# Patient Record
Sex: MALE | Race: WHITE | NOT HISPANIC OR LATINO | Employment: STUDENT | URBAN - METROPOLITAN AREA
[De-identification: names, ages, dates, MRNs, and addresses within clinical notes are randomized per-mention and may not be internally consistent; named-entity substitution may affect disease eponyms.]

---
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
## 2022-09-20 ENCOUNTER — PRE-ADMISSION TESTING (OUTPATIENT)
Dept: PREADMISSION TESTING | Facility: HOSPITAL | Age: 22
End: 2022-09-20

## 2022-09-20 ENCOUNTER — HOSPITAL ENCOUNTER (OUTPATIENT)
Dept: GENERAL RADIOLOGY | Facility: HOSPITAL | Age: 22
Discharge: HOME | End: 2022-09-20

## 2022-09-20 VITALS
WEIGHT: 206 LBS | RESPIRATION RATE: 16 BRPM | SYSTOLIC BLOOD PRESSURE: 140 MMHG | HEIGHT: 75 IN | BODY MASS INDEX: 25.61 KG/M2 | OXYGEN SATURATION: 99 % | TEMPERATURE: 97.8 F | HEART RATE: 66 BPM | DIASTOLIC BLOOD PRESSURE: 78 MMHG

## 2022-09-20 LAB
ALBUMIN SERPL-MCNC: 4.7 G/DL (ref 3.5–5.2)
ALBUMIN/GLOB SERPL: 2 G/DL
ALP SERPL-CCNC: 70 U/L (ref 39–117)
ALT SERPL W P-5'-P-CCNC: 21 U/L (ref 1–41)
ANION GAP SERPL CALCULATED.3IONS-SCNC: 10.6 MMOL/L (ref 5–15)
AST SERPL-CCNC: 24 U/L (ref 1–40)
BASOPHILS # BLD AUTO: 0.02 10*3/MM3 (ref 0–0.2)
BASOPHILS NFR BLD AUTO: 0.4 % (ref 0–1.5)
BILIRUB SERPL-MCNC: 0.4 MG/DL (ref 0–1.2)
BILIRUB UR QL STRIP: NEGATIVE
BUN SERPL-MCNC: 18 MG/DL (ref 6–20)
BUN/CREAT SERPL: 16.2 (ref 7–25)
CALCIUM SPEC-SCNC: 9.7 MG/DL (ref 8.6–10.5)
CHLORIDE SERPL-SCNC: 100 MMOL/L (ref 98–107)
CLARITY UR: CLEAR
CO2 SERPL-SCNC: 27.4 MMOL/L (ref 22–29)
COLOR UR: YELLOW
CREAT SERPL-MCNC: 1.11 MG/DL (ref 0.76–1.27)
DEPRECATED RDW RBC AUTO: 42.2 FL (ref 37–54)
EGFRCR SERPLBLD CKD-EPI 2021: 96.3 ML/MIN/1.73
EOSINOPHIL # BLD AUTO: 0.12 10*3/MM3 (ref 0–0.4)
EOSINOPHIL NFR BLD AUTO: 2.5 % (ref 0.3–6.2)
ERYTHROCYTE [DISTWIDTH] IN BLOOD BY AUTOMATED COUNT: 13.1 % (ref 12.3–15.4)
GLOBULIN UR ELPH-MCNC: 2.4 GM/DL
GLUCOSE SERPL-MCNC: 86 MG/DL (ref 65–99)
GLUCOSE UR STRIP-MCNC: NEGATIVE MG/DL
HCT VFR BLD AUTO: 47.8 % (ref 37.5–51)
HGB BLD-MCNC: 16.1 G/DL (ref 13–17.7)
HGB UR QL STRIP.AUTO: NEGATIVE
IMM GRANULOCYTES # BLD AUTO: 0.02 10*3/MM3 (ref 0–0.05)
IMM GRANULOCYTES NFR BLD AUTO: 0.4 % (ref 0–0.5)
KETONES UR QL STRIP: NEGATIVE
LEUKOCYTE ESTERASE UR QL STRIP.AUTO: NEGATIVE
LYMPHOCYTES # BLD AUTO: 1.76 10*3/MM3 (ref 0.7–3.1)
LYMPHOCYTES NFR BLD AUTO: 36.5 % (ref 19.6–45.3)
MCH RBC QN AUTO: 30 PG (ref 26.6–33)
MCHC RBC AUTO-ENTMCNC: 33.7 G/DL (ref 31.5–35.7)
MCV RBC AUTO: 89.2 FL (ref 79–97)
MONOCYTES # BLD AUTO: 0.29 10*3/MM3 (ref 0.1–0.9)
MONOCYTES NFR BLD AUTO: 6 % (ref 5–12)
NEUTROPHILS NFR BLD AUTO: 2.61 10*3/MM3 (ref 1.7–7)
NEUTROPHILS NFR BLD AUTO: 54.2 % (ref 42.7–76)
NITRITE UR QL STRIP: NEGATIVE
NRBC BLD AUTO-RTO: 0 /100 WBC (ref 0–0.2)
PH UR STRIP.AUTO: 6.5 [PH] (ref 5–8)
PLATELET # BLD AUTO: 164 10*3/MM3 (ref 140–450)
PMV BLD AUTO: 10.2 FL (ref 6–12)
POTASSIUM SERPL-SCNC: 4.4 MMOL/L (ref 3.5–5.2)
PROT SERPL-MCNC: 7.1 G/DL (ref 6–8.5)
PROT UR QL STRIP: NEGATIVE
QT INTERVAL: 392 MS
RBC # BLD AUTO: 5.36 10*6/MM3 (ref 4.14–5.8)
SODIUM SERPL-SCNC: 138 MMOL/L (ref 136–145)
SP GR UR STRIP: 1.01 (ref 1–1.03)
UROBILINOGEN UR QL STRIP: NORMAL
WBC NRBC COR # BLD: 4.82 10*3/MM3 (ref 3.4–10.8)

## 2022-09-20 PROCEDURE — 80053 COMPREHEN METABOLIC PANEL: CPT

## 2022-09-20 PROCEDURE — 93010 ELECTROCARDIOGRAM REPORT: CPT | Performed by: INTERNAL MEDICINE

## 2022-09-20 PROCEDURE — 36415 COLL VENOUS BLD VENIPUNCTURE: CPT

## 2022-09-20 PROCEDURE — 93005 ELECTROCARDIOGRAM TRACING: CPT

## 2022-09-20 PROCEDURE — 71046 X-RAY EXAM CHEST 2 VIEWS: CPT

## 2022-09-20 PROCEDURE — 81003 URINALYSIS AUTO W/O SCOPE: CPT

## 2022-09-20 PROCEDURE — 85025 COMPLETE CBC W/AUTO DIFF WBC: CPT

## 2022-09-20 NOTE — DISCHARGE INSTRUCTIONS
Take the following medications the morning of surgery: none    Arrival time: Hospital will call      General Instructions:  Do not eat solid food after midnight the night before surgery.  You may drink clear liquids day of surgery but must stop at least one hour before your hospital arrival time.  It is beneficial for you to have a clear drink that contains carbohydrates the day of surgery.  We suggest a 12 to 20 ounce bottle of Gatorade or Powerade for non-diabetic patients or a 12 to 20 ounce bottle of G2 or Powerade Zero for diabetic patients. (Pediatric patients, are not advised to drink a 12 to 20 ounce carbohydrate drink)    Clear liquids are liquids you can see through.  Nothing red in color.     Plain water                               Sports drinks  Sodas                                   Gelatin (Jell-O)  Fruit juices without pulp such as white grape juice and apple juice  Popsicles that contain no fruit or yogurt  Tea or coffee (no cream or milk added)  Gatorade / Powerade  G2 / Powerade Zero    Patients who avoid smoking, chewing tobacco and alcohol for 4 weeks prior to surgery have a reduced risk of post-operative complications.  Quit smoking as many days before surgery as you can.  Do not smoke, use chewing tobacco or drink alcohol the day of surgery.   Bring any papers given to you in the doctor’s office.  Wear clean comfortable clothes.  Do not wear contact lenses, false eyelashes or make-up.  Bring a case for your glasses.   Remove all piercings.  Leave jewelry and any other valuables at home.  Hair extensions with metal clips must be removed prior to surgery.  The Pre-Admission Testing nurse will instruct you to bring medications if unable to obtain an accurate list in Pre-Admission Testing.      Preventing a Surgical Site Infection:  For 2 to 3 days before surgery, avoid shaving with a razor because the razor can irritate skin and make it easier to develop an infection.    Any areas of open skin  can increase the risk of a post-operative wound infection by allowing bacteria to enter and travel throughout the body.  Notify your surgeon if you have any skin wounds / rashes even if it is not near the expected surgical site.  The area will need assessed to determine if surgery should be delayed until it is healed.  The night prior to surgery shower using a fresh bar of anti-bacterial soap (such as Dial) and clean washcloth.  Sleep in a clean bed with clean clothing.  Do not allow pets to sleep with you.  Shower on the morning of surgery using a fresh bar of anti-bacterial soap (such as Dial) and clean washcloth.  Dry with a clean towel and dress in clean clothing.  Ask your surgeon if you will be receiving antibiotics prior to surgery.  Make sure you, your family, and all healthcare providers clean their hands with soap and water or an alcohol based hand  before caring for you or your wound.    Day of surgery:  Your arrival time is approximately two hours before your scheduled surgery time.  Upon arrival, a Pre-op nurse and Anesthesiologist will review your health history, obtain vital signs, and answer questions you may have.  The only belongings needed at this time will be a list of your home medications and if applicable your C-PAP/BI-PAP machine.  A Pre-op nurse will start an IV and you may receive medication in preparation for surgery, including something to help you relax.     Please be aware that surgery does come with discomfort.  We want to make every effort to control your discomfort so please discuss any uncontrolled symptoms with your nurse.   Your doctor will most likely have prescribed pain medications.      If you are going home after surgery you will receive individualized written care instructions before being discharged.  A responsible adult must drive you to and from the hospital on the day of your surgery and stay with you for 24 hours.  Discharge prescriptions can be filled by the  hospital pharmacy during regular pharmacy hours.  If you are having surgery late in the day/evening your prescription may be e-prescribed to your pharmacy.  Please verify your pharmacy hours or chose a 24 hour pharmacy to avoid not having access to your prescription because your pharmacy has closed for the day.    If you are staying overnight following surgery, you will be transported to your hospital room following the recovery period.  [facility] has all private rooms.    If you have any questions please call Pre-Admission Testing at [phone].  Deductibles and co-payments are collected on the day of service. Please be prepared to pay the required co-pay, deductible or deposit on the day of service as defined by your plan.    Call your surgeon immediately if you experience any of the following symptoms:  Sore Throat  Shortness of Breath or difficulty breathing  Cough  Chills  Body soreness or muscle pain  Headache  Fever  New loss of taste or smell  Do not arrive for your surgery ill.  Your procedure will need to be rescheduled to another time.  You will need to call your physician before the day of surgery to avoid any unnecessary exposure to hospital staff as well as other patients.

## 2022-09-27 ENCOUNTER — ANESTHESIA (OUTPATIENT)
Dept: PERIOP | Facility: HOSPITAL | Age: 22
End: 2022-09-27

## 2022-09-27 ENCOUNTER — ANESTHESIA EVENT (OUTPATIENT)
Dept: PERIOP | Facility: HOSPITAL | Age: 22
End: 2022-09-27

## 2022-09-27 ENCOUNTER — HOSPITAL ENCOUNTER (OUTPATIENT)
Facility: HOSPITAL | Age: 22
Setting detail: HOSPITAL OUTPATIENT SURGERY
Discharge: HOME | End: 2022-09-27
Attending: ORTHOPAEDIC SURGERY | Admitting: ORTHOPAEDIC SURGERY

## 2022-09-27 VITALS
RESPIRATION RATE: 16 BRPM | HEART RATE: 65 BPM | DIASTOLIC BLOOD PRESSURE: 75 MMHG | SYSTOLIC BLOOD PRESSURE: 126 MMHG | TEMPERATURE: 97.7 F | OXYGEN SATURATION: 100 %

## 2022-09-27 DIAGNOSIS — G89.29 CHRONIC PAIN OF RIGHT KNEE: Primary | ICD-10-CM

## 2022-09-27 DIAGNOSIS — M25.561 CHRONIC PAIN OF RIGHT KNEE: Primary | ICD-10-CM

## 2022-09-27 PROCEDURE — 25010000002 EPINEPHRINE PER 0.1 MG: Performed by: ORTHOPAEDIC SURGERY

## 2022-09-27 PROCEDURE — 25010000002 CEFAZOLIN IN DEXTROSE 2-4 GM/100ML-% SOLUTION: Performed by: ORTHOPAEDIC SURGERY

## 2022-09-27 PROCEDURE — 25010000002 FENTANYL CITRATE (PF) 100 MCG/2ML SOLUTION: Performed by: NURSE ANESTHETIST, CERTIFIED REGISTERED

## 2022-09-27 PROCEDURE — 25010000002 ONDANSETRON PER 1 MG: Performed by: NURSE ANESTHETIST, CERTIFIED REGISTERED

## 2022-09-27 PROCEDURE — 25010000002 DEXAMETHASONE SODIUM PHOSPHATE 20 MG/5ML SOLUTION: Performed by: NURSE ANESTHETIST, CERTIFIED REGISTERED

## 2022-09-27 PROCEDURE — 25010000002 PROPOFOL 10 MG/ML EMULSION: Performed by: NURSE ANESTHETIST, CERTIFIED REGISTERED

## 2022-09-27 RX ORDER — PROMETHAZINE HYDROCHLORIDE 25 MG/1
25 SUPPOSITORY RECTAL ONCE AS NEEDED
Status: DISCONTINUED | OUTPATIENT
Start: 2022-09-27 | End: 2022-09-27 | Stop reason: HOSPADM

## 2022-09-27 RX ORDER — HYDROCODONE BITARTRATE AND ACETAMINOPHEN 7.5; 325 MG/1; MG/1
1 TABLET ORAL ONCE AS NEEDED
Status: DISCONTINUED | OUTPATIENT
Start: 2022-09-27 | End: 2022-09-27 | Stop reason: HOSPADM

## 2022-09-27 RX ORDER — ONDANSETRON 2 MG/ML
4 INJECTION INTRAMUSCULAR; INTRAVENOUS ONCE AS NEEDED
Status: DISCONTINUED | OUTPATIENT
Start: 2022-09-27 | End: 2022-09-27 | Stop reason: HOSPADM

## 2022-09-27 RX ORDER — PROPOFOL 10 MG/ML
VIAL (ML) INTRAVENOUS AS NEEDED
Status: DISCONTINUED | OUTPATIENT
Start: 2022-09-27 | End: 2022-09-27 | Stop reason: SURG

## 2022-09-27 RX ORDER — DIPHENHYDRAMINE HCL 25 MG
25 CAPSULE ORAL
Status: DISCONTINUED | OUTPATIENT
Start: 2022-09-27 | End: 2022-09-27 | Stop reason: HOSPADM

## 2022-09-27 RX ORDER — SODIUM CHLORIDE 0.9 % (FLUSH) 0.9 %
10 SYRINGE (ML) INJECTION EVERY 12 HOURS SCHEDULED
Status: DISCONTINUED | OUTPATIENT
Start: 2022-09-27 | End: 2022-09-27 | Stop reason: HOSPADM

## 2022-09-27 RX ORDER — LIDOCAINE HYDROCHLORIDE 10 MG/ML
0.5 INJECTION, SOLUTION EPIDURAL; INFILTRATION; INTRACAUDAL; PERINEURAL ONCE AS NEEDED
Status: DISCONTINUED | OUTPATIENT
Start: 2022-09-27 | End: 2022-09-27 | Stop reason: HOSPADM

## 2022-09-27 RX ORDER — ASPIRIN 325 MG
325 TABLET, DELAYED RELEASE (ENTERIC COATED) ORAL DAILY
Qty: 21 TABLET | Refills: 0 | Status: SHIPPED | OUTPATIENT
Start: 2022-09-27 | End: 2022-10-18

## 2022-09-27 RX ORDER — FLUMAZENIL 0.1 MG/ML
0.2 INJECTION INTRAVENOUS AS NEEDED
Status: DISCONTINUED | OUTPATIENT
Start: 2022-09-27 | End: 2022-09-27 | Stop reason: HOSPADM

## 2022-09-27 RX ORDER — DIPHENHYDRAMINE HYDROCHLORIDE 50 MG/ML
12.5 INJECTION INTRAMUSCULAR; INTRAVENOUS
Status: DISCONTINUED | OUTPATIENT
Start: 2022-09-27 | End: 2022-09-27 | Stop reason: HOSPADM

## 2022-09-27 RX ORDER — DEXAMETHASONE SODIUM PHOSPHATE 4 MG/ML
INJECTION, SOLUTION INTRA-ARTICULAR; INTRALESIONAL; INTRAMUSCULAR; INTRAVENOUS; SOFT TISSUE AS NEEDED
Status: DISCONTINUED | OUTPATIENT
Start: 2022-09-27 | End: 2022-09-27 | Stop reason: SURG

## 2022-09-27 RX ORDER — MIDAZOLAM HYDROCHLORIDE 1 MG/ML
1 INJECTION INTRAMUSCULAR; INTRAVENOUS
Status: DISCONTINUED | OUTPATIENT
Start: 2022-09-27 | End: 2022-09-27 | Stop reason: HOSPADM

## 2022-09-27 RX ORDER — LABETALOL HYDROCHLORIDE 5 MG/ML
5 INJECTION, SOLUTION INTRAVENOUS
Status: DISCONTINUED | OUTPATIENT
Start: 2022-09-27 | End: 2022-09-27 | Stop reason: HOSPADM

## 2022-09-27 RX ORDER — ACETAMINOPHEN 325 MG/1
650 TABLET ORAL ONCE
Status: COMPLETED | OUTPATIENT
Start: 2022-09-27 | End: 2022-09-27

## 2022-09-27 RX ORDER — LIDOCAINE HYDROCHLORIDE 20 MG/ML
INJECTION, SOLUTION INFILTRATION; PERINEURAL AS NEEDED
Status: DISCONTINUED | OUTPATIENT
Start: 2022-09-27 | End: 2022-09-27 | Stop reason: SURG

## 2022-09-27 RX ORDER — OXYCODONE AND ACETAMINOPHEN 7.5; 325 MG/1; MG/1
1 TABLET ORAL EVERY 4 HOURS PRN
Status: DISCONTINUED | OUTPATIENT
Start: 2022-09-27 | End: 2022-09-27 | Stop reason: HOSPADM

## 2022-09-27 RX ORDER — FENTANYL CITRATE 50 UG/ML
INJECTION, SOLUTION INTRAMUSCULAR; INTRAVENOUS AS NEEDED
Status: DISCONTINUED | OUTPATIENT
Start: 2022-09-27 | End: 2022-09-27 | Stop reason: SURG

## 2022-09-27 RX ORDER — OXYCODONE HYDROCHLORIDE AND ACETAMINOPHEN 5; 325 MG/1; MG/1
1 TABLET ORAL EVERY 6 HOURS PRN
Qty: 30 TABLET | Refills: 0 | Status: SHIPPED | OUTPATIENT
Start: 2022-09-27

## 2022-09-27 RX ORDER — CEFAZOLIN SODIUM 2 G/100ML
2 INJECTION, SOLUTION INTRAVENOUS ONCE
Status: COMPLETED | OUTPATIENT
Start: 2022-09-27 | End: 2022-09-27

## 2022-09-27 RX ORDER — EPHEDRINE SULFATE 50 MG/ML
5 INJECTION, SOLUTION INTRAVENOUS ONCE AS NEEDED
Status: DISCONTINUED | OUTPATIENT
Start: 2022-09-27 | End: 2022-09-27 | Stop reason: HOSPADM

## 2022-09-27 RX ORDER — NALOXONE HCL 0.4 MG/ML
0.2 VIAL (ML) INJECTION AS NEEDED
Status: DISCONTINUED | OUTPATIENT
Start: 2022-09-27 | End: 2022-09-27 | Stop reason: HOSPADM

## 2022-09-27 RX ORDER — PROMETHAZINE HYDROCHLORIDE 12.5 MG/1
25 TABLET ORAL EVERY 6 HOURS PRN
Qty: 30 TABLET | Refills: 0 | Status: SHIPPED | OUTPATIENT
Start: 2022-09-27

## 2022-09-27 RX ORDER — SODIUM CHLORIDE 0.9 % (FLUSH) 0.9 %
10 SYRINGE (ML) INJECTION AS NEEDED
Status: DISCONTINUED | OUTPATIENT
Start: 2022-09-27 | End: 2022-09-27 | Stop reason: HOSPADM

## 2022-09-27 RX ORDER — PROMETHAZINE HYDROCHLORIDE 25 MG/1
25 TABLET ORAL ONCE AS NEEDED
Status: DISCONTINUED | OUTPATIENT
Start: 2022-09-27 | End: 2022-09-27 | Stop reason: HOSPADM

## 2022-09-27 RX ORDER — BUPIVACAINE HYDROCHLORIDE AND EPINEPHRINE 5; 5 MG/ML; UG/ML
INJECTION, SOLUTION EPIDURAL; INTRACAUDAL; PERINEURAL AS NEEDED
Status: DISCONTINUED | OUTPATIENT
Start: 2022-09-27 | End: 2022-09-27 | Stop reason: HOSPADM

## 2022-09-27 RX ORDER — HYDRALAZINE HYDROCHLORIDE 20 MG/ML
5 INJECTION INTRAMUSCULAR; INTRAVENOUS
Status: DISCONTINUED | OUTPATIENT
Start: 2022-09-27 | End: 2022-09-27 | Stop reason: HOSPADM

## 2022-09-27 RX ORDER — SODIUM CHLORIDE, SODIUM LACTATE, POTASSIUM CHLORIDE, CALCIUM CHLORIDE 600; 310; 30; 20 MG/100ML; MG/100ML; MG/100ML; MG/100ML
9 INJECTION, SOLUTION INTRAVENOUS CONTINUOUS PRN
Status: DISCONTINUED | OUTPATIENT
Start: 2022-09-27 | End: 2022-09-27 | Stop reason: HOSPADM

## 2022-09-27 RX ORDER — HYDROMORPHONE HYDROCHLORIDE 1 MG/ML
0.5 INJECTION, SOLUTION INTRAMUSCULAR; INTRAVENOUS; SUBCUTANEOUS
Status: DISCONTINUED | OUTPATIENT
Start: 2022-09-27 | End: 2022-09-27 | Stop reason: HOSPADM

## 2022-09-27 RX ORDER — IBUPROFEN 600 MG/1
600 TABLET ORAL ONCE AS NEEDED
Status: DISCONTINUED | OUTPATIENT
Start: 2022-09-27 | End: 2022-09-27 | Stop reason: HOSPADM

## 2022-09-27 RX ORDER — ONDANSETRON 2 MG/ML
INJECTION INTRAMUSCULAR; INTRAVENOUS AS NEEDED
Status: DISCONTINUED | OUTPATIENT
Start: 2022-09-27 | End: 2022-09-27 | Stop reason: SURG

## 2022-09-27 RX ORDER — FENTANYL CITRATE 50 UG/ML
50 INJECTION, SOLUTION INTRAMUSCULAR; INTRAVENOUS
Status: DISCONTINUED | OUTPATIENT
Start: 2022-09-27 | End: 2022-09-27 | Stop reason: HOSPADM

## 2022-09-27 RX ADMIN — OXYCODONE HYDROCHLORIDE AND ACETAMINOPHEN 1 TABLET: 7.5; 325 TABLET ORAL at 12:15

## 2022-09-27 RX ADMIN — SODIUM CHLORIDE, POTASSIUM CHLORIDE, SODIUM LACTATE AND CALCIUM CHLORIDE 9 ML/HR: 600; 310; 30; 20 INJECTION, SOLUTION INTRAVENOUS at 10:42

## 2022-09-27 RX ADMIN — CEFAZOLIN SODIUM 2 G: 2 INJECTION, SOLUTION INTRAVENOUS at 10:54

## 2022-09-27 RX ADMIN — PROPOFOL 200 MG: 10 INJECTION, EMULSION INTRAVENOUS at 11:06

## 2022-09-27 RX ADMIN — ACETAMINOPHEN 650 MG: 325 TABLET, FILM COATED ORAL at 10:45

## 2022-09-27 RX ADMIN — ONDANSETRON 4 MG: 2 INJECTION INTRAMUSCULAR; INTRAVENOUS at 11:35

## 2022-09-27 RX ADMIN — DEXAMETHASONE SODIUM PHOSPHATE 8 MG: 4 INJECTION, SOLUTION INTRAMUSCULAR; INTRAVENOUS at 11:13

## 2022-09-27 RX ADMIN — FENTANYL CITRATE 50 MCG: 50 INJECTION, SOLUTION INTRAMUSCULAR; INTRAVENOUS at 11:15

## 2022-09-27 RX ADMIN — LIDOCAINE HYDROCHLORIDE 80 MG: 20 INJECTION, SOLUTION INFILTRATION; PERINEURAL at 11:06

## 2022-09-27 NOTE — ANESTHESIA PREPROCEDURE EVALUATION
Anesthesia Evaluation     Patient summary reviewed and Nursing notes reviewed   NPO Solid Status: > 8 hours             Airway   Mallampati: II  TM distance: >3 FB  Neck ROM: full  no difficulty expected  Dental - normal exam     Pulmonary - negative pulmonary ROS and normal exam   Cardiovascular - negative cardio ROS and normal exam        Neuro/Psych- negative ROS  GI/Hepatic/Renal/Endo - negative ROS     Musculoskeletal (-) negative ROS    Abdominal  - normal exam   Substance History - negative use     OB/GYN negative ob/gyn ROS         Other                        Anesthesia Plan    ASA 1     general     (---------------------------               09/27/22                      0949         ---------------------------   BP:          146/87         Pulse:         64           Resp:          16           Temp:   36.6 °C (97.9 °F)   SpO2:          96%         ---------------------------)  intravenous induction     Anesthetic plan, risks, benefits, and alternatives have been provided, discussed and informed consent has been obtained with: patient.        CODE STATUS:

## 2022-09-27 NOTE — ANESTHESIA PROCEDURE NOTES
Airway  Urgency: elective    Date/Time: 9/27/2022 11:08 AM    General Information and Staff    Patient location during procedure: OR  Anesthesiologist: Maritza Roberts MD  CRNA/CAA: Christina Souza CRNA    Indications and Patient Condition  Indications for airway management: airway protection    Preoxygenated: yes  MILS maintained throughout  Mask difficulty assessment: 1 - vent by mask    Final Airway Details  Final airway type: supraglottic airway      Successful airway: LMA  Size 4    Number of attempts at approach: 1  Assessment: lips, teeth, and gum same as pre-op and atraumatic intubation    Additional Comments  Teeth, tongue, lips, and gums in preop condition. VSS throughout. Easy mask/smooth easy insertion.

## 2022-09-27 NOTE — ANESTHESIA POSTPROCEDURE EVALUATION
Patient: Jorge Gotti    Procedure Summary     Date: 09/27/22 Room / Location:  [facility] [address] [place] [address] [facility]    Anesthesia Start: 1100 Anesthesia Stop: 1147    Procedure: RIGHT KNEE ARTHROSCOPY, PARTIAL MEDIAL MENISECTOMY (Right Knee) Diagnosis:     Surgeons: Dakota Monk MD Provider: Bear Li MD    Anesthesia Type: general ASA Status: 1          Anesthesia Type: general    Vitals  Vitals Value Taken Time   /81 09/27/22 1215   Temp 36.5 °C (97.7 °F) 09/27/22 1215   Pulse 66 09/27/22 1220   Resp 17 09/27/22 1215   SpO2 97 % 09/27/22 1220   Vitals shown include unvalidated device data.        Post Anesthesia Care and Evaluation    Patient location during evaluation: bedside  Patient participation: complete - patient participated  Level of consciousness: awake  Pain management: adequate    Airway patency: patent  Anesthetic complications: No anesthetic complications    Cardiovascular status: acceptable  Respiratory status: acceptable  Hydration status: acceptable    Comments: */75 (BP Location: Right arm, Patient Position: Lying)   Pulse 65   Temp 36.5 °C (97.7 °F) (Oral)   Resp 16   SpO2 100%

## 2022-09-27 NOTE — BRIEF OP NOTE
KNEE ARTHROSCOPY  Progress Note    Jorge Gotti  9/27/2022    Pre-op Diagnosis:   Rt mmt       Post-Op Diagnosis Codes:   same    Procedure/CPT® Codes:        Procedure(s):  RIGHT KNEE ARTHROSCOPY, PARTIAL MEDIAL MENISECTOMY        Surgeon(s):  Dakota Monk MD    Anesthesia: General    Staff:   Circulator: Chiqui Donaldson RN  Scrub Person: Martha Monroy APRN; Betty Kothari  Vendor Representative: Linnea Live         Estimated Blood Loss: minimal    Urine Voided: * No values recorded between 9/27/2022 11:00 AM and 9/27/2022 11:33 AM *    Specimens:                None          Drains: * No LDAs found *    Findings: above.  Not repairable        Complications: none known          MEME Monk MD     Date: 9/27/2022  Time: 11:33 EDT

## 2022-09-30 ENCOUNTER — TREATMENT (OUTPATIENT)
Dept: PHYSICAL THERAPY | Facility: CLINIC | Age: 22
End: 2022-09-30

## 2022-09-30 DIAGNOSIS — G89.18 ACUTE POSTOPERATIVE PAIN OF RIGHT KNEE: Primary | ICD-10-CM

## 2022-09-30 DIAGNOSIS — R26.2 DIFFICULTY WALKING: ICD-10-CM

## 2022-09-30 DIAGNOSIS — M25.561 ACUTE POSTOPERATIVE PAIN OF RIGHT KNEE: Primary | ICD-10-CM

## 2022-09-30 DIAGNOSIS — Z98.890 S/P ARTHROSCOPIC PARTIAL MEDIAL MENISCECTOMY: ICD-10-CM

## 2022-09-30 PROCEDURE — 97161 PT EVAL LOW COMPLEX 20 MIN: CPT | Performed by: PHYSICAL THERAPIST

## 2022-09-30 PROCEDURE — 97110 THERAPEUTIC EXERCISES: CPT | Performed by: PHYSICAL THERAPIST

## 2022-09-30 PROCEDURE — 97014 ELECTRIC STIMULATION THERAPY: CPT | Performed by: PHYSICAL THERAPIST

## 2022-09-30 PROCEDURE — 97530 THERAPEUTIC ACTIVITIES: CPT | Performed by: PHYSICAL THERAPIST

## 2022-09-30 NOTE — PROGRESS NOTES
"Physical Therapy Initial Evaluation and Plan of Care    Patient: Jorge Gotti   : 2000  Diagnosis/ICD-10 Code:  Acute postoperative pain of right knee [G89.18, M25.561]  Referring practitioner: Dakota Monk MD    Subjective Evaluation    History of Present Illness  Date of surgery: 2022  Mechanism of injury: S/p (R) knee partial medial menisectomy by Dr. Misbah Monk on 2022.  His original injury was in late /early July when his baseball cleat got stuck in the dirt and twisted his knee.  He will be returning back to school Oct. 10 and plans to pursue physical therapy near school. He is transitioning away from using his crutches and has not used them at all today. He denies (L) knee problems or issues.          Patient Occupation: [employer]  Quality of life: good    Pain  Current pain ratin  At best pain ratin  At worst pain ratin  Location: (L) medial knee, posteromedial knee  Quality: \"achey\"  Relieving factors: medications (1 narcotic pain med, 1 anti-inflammatory)  Aggravating factors: stairs, standing, ambulation and squatting  Progression: improved    Patient Goals  Patient goals for therapy: decreased pain, increased strength, return to sport/leisure activities and increased motion             Subjective Questionnaire: LEFS: 34/80    Objective          Tenderness     Additional Tenderness Details  Neg TTP (R) knee    Active Range of Motion   Left Knee   Flexion: 145 degrees   Extension: 0 degrees   Extensor la degrees     Right Knee   Flexion: 115 (\"just really tight\") degrees   Extension: 0 degrees   Extensor la degrees     Strength/Myotome Testing     Left Hip   Planes of Motion   Flexion: 5  Extension: 4+  Abduction: 4+    Right Hip   Planes of Motion   Flexion: 4+  Extension: 4-  Abduction: 4    Left Knee   Prone flexion: 5  Extension: 5  Quadriceps contraction: good    Right Knee   Prone flexion: 4-  Extension: 4  Quadriceps contraction: " good    Swelling     Left Knee Girth Measurement (cm)   Joint line: 41.0.    Right Knee Girth Measurement (cm)   Joint line: 41.3.          Assessment & Plan     Assessment  Impairments: abnormal coordination, abnormal gait, abnormal or restricted ROM, activity intolerance, impaired balance, impaired physical strength, lacks appropriate home exercise program and pain with function  Functional Limitations: sleeping, walking, uncomfortable because of pain, moving in bed, standing and stooping  Assessment details: Patient is a 22 y.o male s/p (R) knee partial medial menisectomy by Dr. Monk on 09/27/2022. His post-operative pain is fairly well-controlled but he does report symptoms 0-7/10, worst with weightbearing activities such as standing, walking, and stair negotiation as well as discomfort which interrupts his sleep.  He is transitioning away from use of (B) crutches and ambulating independently with moderate antalgia and compensation.  He exhibits decreased (R) knee AROM flexion, decreased proximal > distal LE strength, and gait deficits.  He is a college  and plans to return to sport as soon as appropriate.  His LEFS score is 34/80 indicating a significant perceived level of functional limitation.  He will benefit from skilled PT services to address these deficits, optimize ROM and strength, and assist patient in painfree return to all functional activities as well as a successful return to sport post-operatively.  Prognosis: good    Goals  Plan Goals: STGs: to be met in 3 weeks  1. Patient will be independent with initial HEP  2. Patient will report improved (R) knee symptoms 0-3/10 for improved tolerance to functional activities and mobility  3. Patient will demonstrate improved (R) knee AROM >/= 0-140 deg for improved joint mobility and ability to assume full squat position  4. Patient will ambulate community distances on level and unlevel terrain without compensation   5. Patient will  successfully transition to closed chain strength and stabilization activities without symptom provocation or compensatory patterns    LTGs: to be met in 6 weeks  1. Patient will be independent with progressed HEP  2. Patient will report resolution of (R) knee symptoms for normal functional performance ability  3. Patient will demonstrate sufficient functional strength to allow unilateral strengthening activities in closed chain and plyometrics without compensatory patterns  4. Patient will have improved LEFS score >/= 70/80 for subjective evidence of functional improvement  5. Patient will successfully transition back into baseball training activities without symptom provocation or compensation    Plan  Therapy options: will be seen for skilled therapy services  Planned modality interventions: cryotherapy and electrical stimulation/Russian stimulation  Planned therapy interventions: abdominal trunk stabilization, ADL retraining, balance/weight-bearing training, fine motor coordination training, flexibility, functional ROM exercises, gait training, home exercise program, joint mobilization, manual therapy, neuromuscular re-education, soft tissue mobilization, strengthening, stretching and therapeutic activities  Frequency: 3x week  Duration in weeks: 6  Treatment plan discussed with: patient        Manual Therapy:         mins  81603;  Therapeutic Exercise:    17     mins  03103;     Neuromuscular Zak:        mins  52595;    Therapeutic Activity:     9     mins  04650;     Gait Training:           mins  78982;     Ultrasound:          mins  79480;    Electrical Stimulation:    15     mins  27306 ( );  Dry Needling          mins self-pay    Timed Treatment:   26   mins   Total Treatment:     59   mins    PT SIGNATURE: Esha Glaser PT, DPT, OCS  Electronically signed by: Esha Glaser PT, 09/30/22, 10:30 AM EDT  [place] License #[id number]     DATE TREATMENT INITIATED: 9/30/2022    Initial Certification  Certification  Period: 9/30/2022 thru 12/28/2022  I certify that the therapy services are furnished while this patient is under my care.  The services outlined above are required by this patient, and will be reviewed every 90 days.     PHYSICIAN: Dakota Monk MD  [id number]                                          DATE:     Please sign and return via fax to [phone]. Thank you, [facility].

## 2022-10-03 ENCOUNTER — TREATMENT (OUTPATIENT)
Dept: PHYSICAL THERAPY | Facility: CLINIC | Age: 22
End: 2022-10-03

## 2022-10-03 DIAGNOSIS — R26.2 DIFFICULTY WALKING: ICD-10-CM

## 2022-10-03 DIAGNOSIS — M25.561 ACUTE POSTOPERATIVE PAIN OF RIGHT KNEE: Primary | ICD-10-CM

## 2022-10-03 DIAGNOSIS — Z98.890 S/P ARTHROSCOPIC PARTIAL MEDIAL MENISCECTOMY: ICD-10-CM

## 2022-10-03 DIAGNOSIS — G89.18 ACUTE POSTOPERATIVE PAIN OF RIGHT KNEE: Primary | ICD-10-CM

## 2022-10-03 PROCEDURE — 97014 ELECTRIC STIMULATION THERAPY: CPT | Performed by: PHYSICAL THERAPIST

## 2022-10-03 PROCEDURE — 97110 THERAPEUTIC EXERCISES: CPT | Performed by: PHYSICAL THERAPIST

## 2022-10-03 NOTE — PROGRESS NOTES
Physical Therapy Daily Treatment Note      Patient: Jorge Gotti   : 2000  Referring practitioner: Dakota Monk MD  Date of Initial Visit: Type: THERAPY  Noted: 2022  Today's Date: 10/3/2022  Patient seen for 2 sessions         Visit Diagnoses:     ICD-10-CM ICD-9-CM   1. Acute postoperative pain of right knee  G89.18 719.46    M25.561 338.18   2. S/P arthroscopic partial medial meniscectomy  Z98.890 V45.89   3. Difficulty walking  R26.2 719.7         Subjective Evaluation    History of Present Illness    Subjective comment: My knee has stayed pretty sore but it feels a little better when I move it.  The bike feels ok; my leg just gets tired toward the end.        Objective   See Exercise, Manual, and Modality Logs for complete treatment.   *Initiated prone hip extension/SLR, prone knee flexion, calf raises, gastroc stretch, and retrostep    Assessment & Plan     Assessment    Assessment details: Patient exhibits improved quality of gait with minimal antalgia without AD this date.  There is some ecchymosis beginning around medial knee incision.  Per visual assessment contour of knee indicates appropriate healing and reduction of post-operative inflammation.  He is able to perform full revolution for entirety of stationary bike performance this date as well as progress basic AROM and hip/quad strengthening activities.  He is encouraged to modify intensity of effort with quad sets to reduce symptoms.           Progress per Plan of Care         Timed:  Manual Therapy:         mins  99354;  Therapeutic Exercise:    28     mins  87712;     Neuromuscular Zak:        mins  52794;    Therapeutic Activity:          mins  50612;     Gait Training:           mins  47333;     Ultrasound:          mins  34312;    Untimed:  Electrical Stimulation:    15     mins  22189 ( );  Mechanical Traction:         mins  52240;   Dry Needling              ___  mins   24029    Timed Treatment:   28   mins    Total Treatment:     43   mins    Esha Glaser PT, DPT, OCS  Physical Therapist  [place] License #[id number]  Electronically signed by: Esha Glaser PT, 10/03/22, 1:27 PM EDT

## 2022-10-05 ENCOUNTER — TREATMENT (OUTPATIENT)
Dept: PHYSICAL THERAPY | Facility: CLINIC | Age: 22
End: 2022-10-05

## 2022-10-05 DIAGNOSIS — M25.561 ACUTE POSTOPERATIVE PAIN OF RIGHT KNEE: Primary | ICD-10-CM

## 2022-10-05 DIAGNOSIS — Z98.890 S/P ARTHROSCOPIC PARTIAL MEDIAL MENISCECTOMY: ICD-10-CM

## 2022-10-05 DIAGNOSIS — G89.18 ACUTE POSTOPERATIVE PAIN OF RIGHT KNEE: Primary | ICD-10-CM

## 2022-10-05 DIAGNOSIS — R26.2 DIFFICULTY WALKING: ICD-10-CM

## 2022-10-05 PROCEDURE — 97112 NEUROMUSCULAR REEDUCATION: CPT | Performed by: PHYSICAL THERAPIST

## 2022-10-05 PROCEDURE — 97110 THERAPEUTIC EXERCISES: CPT | Performed by: PHYSICAL THERAPIST

## 2022-10-05 PROCEDURE — 97014 ELECTRIC STIMULATION THERAPY: CPT | Performed by: PHYSICAL THERAPIST

## 2022-10-05 NOTE — PROGRESS NOTES
Physical Therapy Daily Treatment Note      Patient: Jorge Gotti   : 2000  Referring practitioner: Dakota Monk MD  Date of Initial Visit: Type: THERAPY  Noted: 2022  Today's Date: 10/5/2022  Patient seen for 3 sessions         Visit Diagnoses:     ICD-10-CM ICD-9-CM   1. Acute postoperative pain of right knee  G89.18 719.46    M25.561 338.18   2. S/P arthroscopic partial medial meniscectomy  Z98.890 V45.89   3. Difficulty walking  R26.2 719.7         Subjective Evaluation    History of Present Illness    Subjective comment: My knee is ok.  It's not really hurting or anything; just feels a little tight and not normal yet.  It bothers me at random times when I am not really doing anything.       Objective   See Exercise, Manual, and Modality Logs for complete treatment.   *Initiated bridging, TKE, and FW/LAT step-ups    Assessment & Plan     Assessment    Assessment details: Patient demonstrates ability to progress exercise repetitions this date without symptom provocation, but continues to perform quadriceps activation during all strengthening activities at approximately 50-60% of MVC to perform painfree.  He exhibits good neuromuscular control with closed chain activities.  He does demonstrate some tendency toward knee hyperextension during mid to terminal stance phase of gait (R) LE which will benefit from progression of quadriceps strengthening activities.           Progress strengthening /stabilization /functional activity - anticipate one additional visit prior to patient returning to college and resuming his PT there.         Timed:  Manual Therapy:         mins  33622;  Therapeutic Exercise:    28     mins  11146;     Neuromuscular Zak:    10    mins  00961;    Therapeutic Activity:          mins  52356;     Gait Training:           mins  54719;     Ultrasound:          mins  44151;    Untimed:  Electrical Stimulation:    15     mins  44203 ( );  Mechanical Traction:          mins  92451;   Dry Needling              ___  mins   47942    Timed Treatment:   38   mins   Total Treatment:     55   mins    Esha Glaser PT, DPT, OCS  Physical Therapist  [place] License #[id number]  Electronically signed by: Esha Glaser PT, 10/05/22, 9:29 AM EDT

## 2022-10-07 ENCOUNTER — TREATMENT (OUTPATIENT)
Dept: PHYSICAL THERAPY | Facility: CLINIC | Age: 22
End: 2022-10-07

## 2022-10-07 DIAGNOSIS — G89.18 ACUTE POSTOPERATIVE PAIN OF RIGHT KNEE: Primary | ICD-10-CM

## 2022-10-07 DIAGNOSIS — Z98.890 S/P ARTHROSCOPIC PARTIAL MEDIAL MENISCECTOMY: ICD-10-CM

## 2022-10-07 DIAGNOSIS — R26.2 DIFFICULTY WALKING: ICD-10-CM

## 2022-10-07 DIAGNOSIS — M25.561 ACUTE POSTOPERATIVE PAIN OF RIGHT KNEE: Primary | ICD-10-CM

## 2022-10-07 PROCEDURE — 97110 THERAPEUTIC EXERCISES: CPT | Performed by: PHYSICAL THERAPIST

## 2022-10-07 PROCEDURE — 97014 ELECTRIC STIMULATION THERAPY: CPT | Performed by: PHYSICAL THERAPIST

## 2022-10-07 PROCEDURE — 97530 THERAPEUTIC ACTIVITIES: CPT | Performed by: PHYSICAL THERAPIST

## 2022-10-07 NOTE — PROGRESS NOTES
Physical Therapy Daily Treatment Note      [address] [facility], [address], [place] [address]    Patient: Jorge Gotti   : 2000  Referring practitioner: Dakota Monk MD  Date of Initial Visit: Type: THERAPY  Noted: 2022  Today's Date: 10/7/2022  Patient seen for 4 sessions         Visit Diagnoses:     ICD-10-CM ICD-9-CM   1. Acute postoperative pain of right knee  G89.18 719.46    M25.561 338.18   2. S/P arthroscopic partial medial meniscectomy  Z98.890 V45.89   3. Difficulty walking  R26.2 719.7         Subjective     Objective   See Exercise, Manual, and Modality Logs for complete treatment.       Assessment/Plan      {AMB PT PLAN (SOAP Note):78555}           Timed:  Manual Therapy:    ***     mins  95957;  Therapeutic Exercise:    ***     mins  03061;     Neuromuscular Zak:    ***    mins  01562;    Therapeutic Activity:     ***     mins  70776;     Gait Training:      ***     mins  31071;     Ultrasound:     ***     mins  49929;    Untimed:  Electrical Stimulation:    ***     mins  46491 ( );  Mechanical Traction:    ***     mins  24013;   Dry Needling              __***_  mins   97459    Timed Treatment:   ***   mins   Total Treatment:     ***   mins    Esha Glaser PT, DPT, [facility]  Physical Therapist  [place] License #[id number]  Electronically signed by: Esha Glaser PT, 10/07/22, 1:39 PM EDT

## 2022-10-07 NOTE — PROGRESS NOTES
Physical Therapy Progress Note  [address] [facility], [address], [place] [address]    10/7/2022  Dakota Monk MD    Re: Jorge Gotti  ________________________________________________________________    Visit # 4    Mr. Jorge Gotti, has attended 4/4 PT sessions.  Treatment has consisted of: patient education, therapeutic exercise, and therapeutic activity.     Subjective Evaluation    History of Present Illness    Subjective comment: My knee actually feels really good today.  It's not hurting.  Today is the best it's felt since surgery.Pain  Current pain ratin           Objective          Active Range of Motion     Right Knee   Flexion: 135 degrees   Extension: 0 degrees   Extensor la degrees     Passive Range of Motion     Right Knee   Flexion: 145 degrees   Extension: 0 degrees     Swelling     Right Knee Girth Measurement (cm)   Joint line: 41.0.      See Exercise, Manual, and Modality Logs for complete treatment.   *Initiated FW and LAT mini lunges onto (R) LE    Assessment & Plan     Assessment    Assessment details: Patient has been motivated and compliant with PT interventions.  He reports good symptom improvement in the first week and a half since surgery, reporting today his (R) knee is painfree and is the best it has felt so far.  He has achieved (R) knee AROM 0-135 deg and PROM 0-145 deg.  He reports some discomfort with end PROM knee flexion only.  His strength is good but neuromuscular control of (R) LE remains suboptimal as does co-contraction of (R) LE musculature in closed chain.  He experiences early onset of muscular fatigue with basic functional strengthening activities.  Patient will benefit to continue skilled PT services and as he will be returning to college in [place] next week, he plans to pursue such services there.          Other - patient to f/u with Dr. Monk on Monday, 10/10 and then resume PT when he returns to college immediately following.         Manual  Therapy:         mins  23150;  Therapeutic Exercise:    29     mins  63130;     Neuromuscular Zak:        mins  00819;    Therapeutic Activity:     12     mins  25553;     Gait Training:           mins  50799;     Ultrasound:          mins  36186;    Electrical Stimulation:    15     mins  89620 ( );  Dry Needling          mins self-pay    Timed Treatment:   41   mins   Total Treatment:     56   mins    Esha Glaser PT, DPT, OCS  Physical Therapist  [place] License # [id number]

## 2022-11-01 ENCOUNTER — DOCUMENTATION (OUTPATIENT)
Dept: PHYSICAL THERAPY | Facility: CLINIC | Age: 22
End: 2022-11-01

## (undated) DEVICE — TUBING, SUCTION, 1/4" X 20', STRAIGHT: Brand: MEDLINE INDUSTRIES, INC.

## (undated) DEVICE — SUT ETHLN 4/0 PS2 PLSTC 1667G

## (undated) DEVICE — UNDERCAST PADDING: Brand: DEROYAL

## (undated) DEVICE — SKIN PREP TRAY W/CHG: Brand: MEDLINE INDUSTRIES, INC.

## (undated) DEVICE — PK ARTHSCP 40

## (undated) DEVICE — TUBING SET, GRAVITY, 4-SPIKE

## (undated) DEVICE — BLD SHV DBL/CUT COOLCUT 4MM 13CM

## (undated) DEVICE — STCKNT IMPERV 12IN STRL

## (undated) DEVICE — DRSNG WND GZ CURAD OIL EMULSION 3X3IN STRL

## (undated) DEVICE — PAD,ABDOMINAL,8"X10",ST,LF: Brand: MEDLINE

## (undated) DEVICE — GLV SURG BIOGEL LTX PF 8

## (undated) DEVICE — GLV SURG SIGNATURE ESSENTIAL PF LTX SZ7.5

## (undated) DEVICE — ABL ASP APOLLO RF XL 90D

## (undated) DEVICE — BNDG ELAS ELITE V/CLOSE 6IN 5YD LF STRL

## (undated) DEVICE — GOWN,NON-REINFORCED,SIRUS,SET IN SLV,XL: Brand: MEDLINE